# Patient Record
Sex: MALE | Race: WHITE
[De-identification: names, ages, dates, MRNs, and addresses within clinical notes are randomized per-mention and may not be internally consistent; named-entity substitution may affect disease eponyms.]

---

## 2019-03-14 ENCOUNTER — HOSPITAL ENCOUNTER (EMERGENCY)
Dept: HOSPITAL 25 - UCEAST | Age: 49
Discharge: HOME | End: 2019-03-14
Payer: COMMERCIAL

## 2019-03-14 VITALS — SYSTOLIC BLOOD PRESSURE: 135 MMHG | DIASTOLIC BLOOD PRESSURE: 87 MMHG

## 2019-03-14 DIAGNOSIS — S91.332A: Primary | ICD-10-CM
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
## 2019-03-14 DIAGNOSIS — F17.210: ICD-10-CM
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
## 2019-03-14 PROCEDURE — G0463 HOSPITAL OUTPT CLINIC VISIT: HCPCS

## 2019-03-14 PROCEDURE — 99212 OFFICE O/P EST SF 10 MIN: CPT

## 2019-03-14 NOTE — UC
Lower Extremity/Ankle HPI





- HPI Summary


HPI Summary: 


48-year-old with the complaint of increasing pain at the bottom of his left 

foot over the past 3 days.  He was wearing a boot when a nail went into his 

foot in the area just distal to the calcaneus bone.  He works in a field and 

his feet are wet all day.  He works putting in solar fields.  He didn't notice 

any discomfort until the last day and now the area is significantly tender to 

touch.





The patient is a heavy smoker but denies any significant past medical history.  

There is a history of diabetes in his family.  The patient has no primary care.





His vital signs are stable.  His blood pressure is hypertensive at 135/87.  He 

reports 5/10 pain to the left foot.








Nurses note: "stepped on nail with left foot, 3 days ago. he states that the 

pain has persisted and he is concerned it is getting infected. last tetanus 

vaccine was 3-4 years ago."





- History of Current Complaint


Chief Complaint: UCLowerExtremity


Stated Complaint: PUNCTURE WOUND TO FOOT


Time Seen by Provider: 03/14/19 11:45


Pain Intensity: 5





- Allergies/Home Medications


Allergies/Adverse Reactions: 


 Allergies











Allergy/AdvReac Type Severity Reaction Status Date / Time


 


No Known Allergies Allergy   Verified 03/14/19 11:23











Home Medications: 


 Home Medications





Ibuprofen TAB* [Advil TAB*] 800 mg PO ONCE PRN 03/14/19 [History Confirmed 03/14 /19]











PMH/Surg Hx/FS Hx/Imm Hx





- Additional Past Medical History


Additional PMH: 


PAST MEDICAL HISTORY: Patient denies significant past medical history including 

serious illness or surgery.





Family history: Diabetes mellitus.





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





Previously Healthy: Yes





- Surgical History


Surgical History: None





- Family History


Known Family History: Positive: Hypertension





- Social History


Alcohol Use: Weekly


Substance Use Type: None


Smoking Status (MU): Heavy Every Day Tobacco Smoker


Type: Cigarettes


Amount Used/How Often: 1/2 PPD


Length of Time of Smoking/Using Tobacco: 25 YEARS





Review of Systems


All Other Systems Reviewed And Are Negative: Yes


Constitutional: Positive: Negative.  Negative: Fever


Skin: Negative: Rash, Bruising


Respiratory: Positive: Negative


Cardiovascular: Positive: Negative


Gastrointestinal: Positive: Negative


Genitourinary: Positive: Negative


Musculoskeletal: Positive: Myalgia - Pain to palpation and with walking on the 

bottom of the left foot.  Negative: Edema





Physical Exam





- Summary


Physical Exam Summary: 





Appearance: The patient is well-appearing, is in no pain or distress, and is 

well-nourished.


Eyes: Conjunctiva are clear.  Pupils are equal and reactive to light and 

accommodation.  Extra ocular muscle movement is intact.


ENT: The hearing is grossly normal, the pharynx is normal, and the TMs are 

normal. There is no muffled or hoarse voice.  No stridor.


Neck: The neck is supple and there is no lymphadenopathy.


Respiratory: The chest is nontender to palpation and without crepitus. The 

lungs are clear, there are normal breath sounds, and there is no respiratory 

distress.  No wheezes, rales or rhonchi.


Cardiovascular: Heart sounds reveal a regular rate and rhythm. There are no 

clicks, rubs or murmurs.  There are no carotid bruits or thrills.  Circulation 

is grossly intact.


Abdomen: The abdomen is soft and nontender. There is no organomegaly.  Bowel 

sounds are present and within normal limits.  No point tenderness at McBurneys 

point.


Musculoskeletal: Strength is intact. The patient moves all extremities.  The 

bottom of the left foot shows a puncture wound that is closed that is 

approximately 2 mm in diameter.  It is located just distal to the calcaneus 

bone.  There is slight edema around the wound but no erythema.  However, it is 

very tender to palpation.  There is no ascending lymphangitis or cellulitis.


Neurological: The patient is alert. Motor and sensory are  examination grossly 

intact.  Speech is normal.


Psychological: The patient displays age appropriate behavior


Skin: Negative for rashes. 





Triage Information Reviewed: Yes


Vital Signs: 


 Initial Vital Signs











Temp  98.2 F   03/14/19 11:21


 


Pulse  73   03/14/19 11:21


 


Resp  16   03/14/19 11:21


 


BP  135/87   03/14/19 11:21


 


Pulse Ox  98   03/14/19 11:21














Lower Extremity Course/Dx





- Course


Course Of Treatment: 





48-year-old with the complaint of increasing pain at the bottom of his left 

foot over the past 3 days.  He was wearing a boot when a nail went into his 

foot in the area just distal to the calcaneus bone.  He works in a field and 

his feet are wet all day.  He works putting in solar fields.  He didn't notice 

any discomfort until the last day and now the area is significantly tender to 

touch.





The patient is a heavy smoker but denies any significant past medical history.  

There is a history of diabetes in his family.  The patient has no primary care.





His vital signs are stable.  His blood pressure is hypertensive at 135/87.  He 

reports 5/10 pain to the left foot.





X RAY IMPRESSION:


NO ACUTE OSSEOUS INJURY. NO APPRECIABLE EROSION OR PERIOSTEAL REACTION. PLAIN 

FILM


FINDINGS OF OSTEOMYELITIS ARE RELATIVELY LATE FINDINGS. IF THERE IS PERSISTENT 

CLINICAL


CONCERN FOR OSTEOMYELITIS, RECOMMEND CORRELATION WITH FOLLOWUP IMAGING, THREE-

PHASE BONE


SCANNING, WHITE BLOOD CELL SCAN, AND/OR MRI OF THE AFFECTED REGION.





- Differential Dx/Diagnosis


Differential Diagnosis/HQI/PQRI: Contusion, Fracture (Closed), Septic Arthritis

, Sprain, Strain


Provider Diagnosis: 


 Puncture wound, Cellulitis








Discharge





- Sign-Out/Discharge


Documenting (check all that apply): Patient Departure


All imaging exams completed and their final reports reviewed: Yes





- Discharge Plan


Condition: Stable


Disposition: HOME


Prescriptions: 


Cephalexin CAP* [Keflex 500 CAP*] 500 mg PO QID #20 cap MDD 4


Levofloxacin TAB* [Levaquin TAB*] 750 mg PO DAILY #5 tab MDD 1


Patient Education Materials:  Cellulitis (DC)


Forms:  *Gen. Provider Communication, *Work Release


Referrals: 


No Primary Care Phys,NOPCP [Primary Care Provider] - 


Additional Instructions: 





AS WE DISCUSSED: 


 


PLEASE SEEK CARE AT THE EMERGENCY DEPARTMENT IF SYMPTOMS WORSEN OR IF NEW 

SYMPTOMS DEVELOP.  





FOLLOW UP WITH YOUR PRIMARY CARE PHYSICIAN IF CONDITION CONTINUES BEYOND 3 DAYS 

WITHOUT IMPROVEMENT.





We are open from 7 a.m. to 10 p.m.  Call us with any questions or concerns.





YOUR DIAGNOSIS IS:  SKIN INFECTION, CELLULITIS AT THE BOTTOM OF YOUR LEFT FOOT.





ON X-RAY, THERE WAS NO EVIDENCE THAT THE NAIL PUNCTURED THE BONE.





YOUR PRESCRIPTION RECOMMENDATION IS: I HAVE GIVEN YOU TO ANTIBIOTICS.  KEFLEX .

  HE WILL TAKE 4 TIMES A DAY FOR 5 DAYS.  LEVAQUIN.  HE WILL TAKE ONCE A DAY 

FOR 5 DAYS.





OTHER INSTRUCTIONS: IT IS IMPORTANT THAT YOU SOAK YOUR FOOT AS OFTEN AS 

POSSIBLE IN WARM SOAPY WATER.  AT LEAST 4 TIMES A DAY.  ELEVATE HER FOOT WHEN 

SITTING OR SLEEPING.





IT IS ALSO IMPORTANT THAT YOU FOLLOW-UP AND GET RECHECKED IN THE NEXT 2 DAYS TO 

MAKE SURE YOU'RE GETTING BETTER.





GO TO THE EMERGENCY DEPARTMENT TOMORROW IF YOUR FOOT STARTED SWELLING, GETS RED 

OR GETS MORE PAINFUL.





YOU WOULD ALSO BENEFIT FROM CHECKING HER BLOOD PRESSURE AND FINDING PRIMARY 

CARE.  YOUR BLOOD PRESSURE WAS ELEVATED TODAY.





YOU CAN CALL [facility] FOR FOLLOW UP: [phone].








Hypertension Discharge Instructions:





Your blood pressure reading today was 135/87, indicating HYPERTENSION. Follow-

up with your  provider within 4 weeks for blood pressure check and appropriate 

recommendations and treatment, as needed. 








For pain:  Ibuprofen (Motrin and other brand names) 400-600mg PLUS 

acetaminophen (Tylenol and other brand names) 500mg - 1000mg every 8 hours. 

Maximum is 3 doses a day. If this dosage is required for more than 5 days, you 

should re-check with your doctor.  The combination of these two over-the-

counter medications can be more effective than each one taken alone.  Please 

check with the pharmacist if you have questions about your allergies to these 

medications.





To help you sleep: If you feel as if you want to calm down and get sleepy, over 

the counter diphenhydramine (Benadryl and other brand names), 25 mg up to every 

8 hours may be useful.  Please check with the pharmacist if you have questions 

about your allergies to these medications.  Please check with the pharmacist if 

you have questions about your allergies to these medications.











- Billing Disposition and Condition


Condition: STABLE


Disposition: Home

## 2019-07-23 ENCOUNTER — HOSPITAL ENCOUNTER (EMERGENCY)
Dept: HOSPITAL 25 - ED | Age: 49
Discharge: HOME | End: 2019-07-23
Payer: SELF-PAY

## 2019-07-23 VITALS — SYSTOLIC BLOOD PRESSURE: 133 MMHG | DIASTOLIC BLOOD PRESSURE: 89 MMHG

## 2019-07-23 DIAGNOSIS — F17.210: ICD-10-CM

## 2019-07-23 DIAGNOSIS — I10: ICD-10-CM

## 2019-07-23 DIAGNOSIS — H10.33: ICD-10-CM

## 2019-07-23 DIAGNOSIS — J40: Primary | ICD-10-CM

## 2019-07-23 PROCEDURE — 99282 EMERGENCY DEPT VISIT SF MDM: CPT

## 2019-07-23 PROCEDURE — 71046 X-RAY EXAM CHEST 2 VIEWS: CPT
